# Patient Record
Sex: MALE | Race: OTHER | NOT HISPANIC OR LATINO | URBAN - METROPOLITAN AREA
[De-identification: names, ages, dates, MRNs, and addresses within clinical notes are randomized per-mention and may not be internally consistent; named-entity substitution may affect disease eponyms.]

---

## 2021-05-03 ENCOUNTER — EMERGENCY (EMERGENCY)
Facility: HOSPITAL | Age: 21
LOS: 0 days | Discharge: HOME | End: 2021-05-03
Attending: STUDENT IN AN ORGANIZED HEALTH CARE EDUCATION/TRAINING PROGRAM | Admitting: STUDENT IN AN ORGANIZED HEALTH CARE EDUCATION/TRAINING PROGRAM
Payer: COMMERCIAL

## 2021-05-03 VITALS
SYSTOLIC BLOOD PRESSURE: 148 MMHG | OXYGEN SATURATION: 98 % | TEMPERATURE: 98 F | DIASTOLIC BLOOD PRESSURE: 86 MMHG | WEIGHT: 184.97 LBS | RESPIRATION RATE: 17 BRPM | HEART RATE: 65 BPM
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
## 2021-05-03 DIAGNOSIS — S29.9XXA UNSPECIFIED INJURY OF THORAX, INITIAL ENCOUNTER: ICD-10-CM
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
## 2021-05-03 PROCEDURE — 71046 X-RAY EXAM CHEST 2 VIEWS: CPT | Mod: 26

## 2021-05-03 PROCEDURE — 99284 EMERGENCY DEPT VISIT MOD MDM: CPT

## 2021-05-03 RX ORDER — IBUPROFEN 200 MG
600 TABLET ORAL ONCE
Refills: 0 | Status: COMPLETED | OUTPATIENT
Start: 2021-05-03 | End: 2021-05-03

## 2021-05-03 RX ADMIN — Medication 600 MILLIGRAM(S): at 10:52

## 2021-05-03 NOTE — ED PROVIDER NOTE - NSFOLLOWUPINSTRUCTIONS_ED_ALL_ED_FT
- You may take Ibuprofen every 6 hours as needed for pain relief and inflammation    Costochondritis  Costochondritis is swelling and irritation (inflammation) of the tissue (cartilage) that connects your ribs to your breastbone (sternum). This causes pain in the front of your chest. Usually, the pain:  Starts gradually.  Is in more than one rib.  This condition usually goes away on its own over time.    Follow these instructions at home:  Do not do anything that makes your pain worse.  If directed, put ice on the painful area:  Put ice in a plastic bag.  Place a towel between your skin and the bag.  Leave the ice on for 20 minutes, 2–3 times a day.  If directed, put heat on the affected area as often as told by your doctor. Use the heat source that your doctor tells you to use, such as a moist heat pack or a heating pad.  Place a towel between your skin and the heat source.  Leave the heat on for 20–30 minutes.  Take off the heat if your skin turns bright red. This is very important if you cannot feel pain, heat, or cold. You may have a greater risk of getting burned.  Take over-the-counter and prescription medicines only as told by your doctor.  Return to your normal activities as told by your doctor. Ask your doctor what activities are safe for you.  Keep all follow-up visits as told by your doctor. This is important.  Contact a doctor if:  You have chills or a fever.  Your pain does not go away or it gets worse.  You have a cough that does not go away.  Get help right away if:  You are short of breath.  This information is not intended to replace advice given to you by your health care provider. Make sure you discuss any questions you have with your health care provider.

## 2021-05-03 NOTE — ED PROVIDER NOTE - PROGRESS NOTE DETAILS
AH - cxr unremarkable, no pnx, rib fx seen; pt updated with results and explained outpatient management; Patient to be discharged from ED. Any available test results were discussed with patient and/or family. Verbal instructions given, including instructions to return to ED immediately for any new, worsening, or concerning symptoms. Strict return precautions given. Patient endorsed understanding. Written discharge instructions additionally given, including follow-up plan

## 2021-05-03 NOTE — ED PROVIDER NOTE - PHYSICAL EXAMINATION
CONSTITUTIONAL: Well-developed; well-nourished; in no acute distress  SKIN: Warm, dry  HEAD: Normocephalic; atraumatic  NECK: Supple; non tender  CARD:  Regular rate and rhythm; focal TTP of L anterolateral chest wall at ribs 6-7, no swelling, hematoma, or bruising  RESP: CTAB  EXT: Normal ROM.  No edema  NEURO: A&O x3, grossly unremarkable, no focal deficits  PSYCH: Cooperative, appropriate

## 2021-05-03 NOTE — ED PROVIDER NOTE - NS ED ROS FT
Review of Systems:  CONSTITUTIONAL: No fever, No diaphoresis  SKIN: No rash  HEMATOLOGIC: No abnormal bleeding  RESPIRATORY: No shortness of breath, No cough  CARDIAC: No chest pain, No palpitations  MUSCULOSKELETAL: + rib paint, No swelling, No back pain  NEUROLOGIC: No numbness, No focal weakness, No headache, No dizziness  All other systems negative, unless specified in HPI

## 2021-05-03 NOTE — ED PROVIDER NOTE - PATIENT PORTAL LINK FT
You can access the FollowMyHealth Patient Portal offered by [facility] by registering at the following website: [email or web address]. By joining [facility] FollowMyHealth portal, you will also be able to view your health information using other applications (apps) compatible with our system.

## 2021-05-03 NOTE — ED PROVIDER NOTE - CLINICAL SUMMARY MEDICAL DECISION MAKING FREE TEXT BOX
wet read of xr negative, no obvious fx or ptx. likely contussion. will dc w/ return precautions, rec nsaids

## 2021-05-03 NOTE — ED PROVIDER NOTE - OBJECTIVE STATEMENT
21 yo M with no sig PMH who presents with L-sided rib pain s/p basketball injury 2 days ago.  Pt playing basketball, elbowed in L chest.  Pain worse with inspiration and movement.  Improved with rest.  Only tried tylenol with minimal relief.  Last dose 7 AM.  Pt denies any other injuries, head injury, neck pain, SOB, chest pain, n/v, fevers, chills.